# Patient Record
Sex: FEMALE | Race: WHITE | NOT HISPANIC OR LATINO | Employment: OTHER | URBAN - METROPOLITAN AREA
[De-identification: names, ages, dates, MRNs, and addresses within clinical notes are randomized per-mention and may not be internally consistent; named-entity substitution may affect disease eponyms.]

---

## 2023-03-14 ENCOUNTER — HOSPITAL ENCOUNTER (EMERGENCY)
Facility: HOSPITAL | Age: 68
Discharge: LEFT BEFORE DISCHARGE | End: 2023-03-15
Payer: MEDICARE

## 2023-03-14 PROCEDURE — 99211 OFF/OP EST MAY X REQ PHY/QHP: CPT

## 2023-03-15 VITALS
RESPIRATION RATE: 21 BRPM | DIASTOLIC BLOOD PRESSURE: 164 MMHG | WEIGHT: 190.04 LBS | TEMPERATURE: 98.4 F | SYSTOLIC BLOOD PRESSURE: 206 MMHG | OXYGEN SATURATION: 99 % | HEART RATE: 107 BPM | HEIGHT: 60 IN | BODY MASS INDEX: 37.31 KG/M2

## 2023-03-15 NOTE — ED NOTES
Patient stated they wanted to leave AMA since no one had been in to see them. Told providers before having patient sign

## 2024-12-13 DIAGNOSIS — C79.31 MALIGNANT NEOPLASM OF BREAST METASTATIC TO BRAIN, UNSPECIFIED LATERALITY: ICD-10-CM

## 2024-12-13 DIAGNOSIS — C50.919 MALIGNANT NEOPLASM OF FEMALE BREAST, UNSPECIFIED ESTROGEN RECEPTOR STATUS, UNSPECIFIED LATERALITY, UNSPECIFIED SITE OF BREAST: Primary | ICD-10-CM

## 2024-12-13 DIAGNOSIS — C50.919 MALIGNANT NEOPLASM OF BREAST METASTATIC TO BRAIN, UNSPECIFIED LATERALITY: ICD-10-CM

## 2024-12-13 RX ORDER — MORPHINE SULFATE 100 MG/5ML
5 SOLUTION ORAL EVERY 4 HOURS PRN
Qty: 30 ML | Refills: 0 | Status: SHIPPED | OUTPATIENT
Start: 2024-12-13

## 2024-12-13 RX ORDER — LORAZEPAM 2 MG/ML
1 CONCENTRATE ORAL EVERY 4 HOURS PRN
Qty: 30 ML | Refills: 0 | Status: SHIPPED | OUTPATIENT
Start: 2024-12-13

## 2024-12-26 RX ORDER — HALOPERIDOL 2 MG/ML
1 SOLUTION ORAL EVERY 4 HOURS PRN
Qty: 30 ML | Refills: 0 | Status: SHIPPED | OUTPATIENT
Start: 2024-12-26